# Patient Record
Sex: MALE | URBAN - METROPOLITAN AREA
[De-identification: names, ages, dates, MRNs, and addresses within clinical notes are randomized per-mention and may not be internally consistent; named-entity substitution may affect disease eponyms.]

---

## 2019-01-03 ENCOUNTER — OFFICE VISIT (OUTPATIENT)
Dept: URBAN - METROPOLITAN AREA CLINIC 23 | Facility: CLINIC | Age: 69
End: 2019-01-03
Payer: MEDICARE

## 2019-01-03 DIAGNOSIS — E10.3293 TYPE 1 DIAB W MILD NONPRLF DIABETIC RTNOP W/O MACULAR EDEMA, BILATERAL: Primary | ICD-10-CM

## 2019-01-03 PROCEDURE — 92134 CPTRZ OPH DX IMG PST SGM RTA: CPT | Performed by: OPHTHALMOLOGY

## 2019-01-03 PROCEDURE — 92004 COMPRE OPH EXAM NEW PT 1/>: CPT | Performed by: OPHTHALMOLOGY

## 2019-01-03 RX ORDER — ASPIRIN 325 MG/1
325 MG TABLET, FILM COATED ORAL
Qty: 0 | Refills: 0 | Status: INACTIVE
Start: 2019-01-03 | End: 2019-01-03

## 2019-01-03 ASSESSMENT — INTRAOCULAR PRESSURE
OD: 10
OS: 11

## 2019-01-03 NOTE — IMPRESSION/PLAN
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 OU. Condition: stable. Vision: vision not affected. Per pt: he is Type I Diabetic. Plan: Discussed diagnosis in detail with patient. Exam shows minimal Diabetic changes OU. No treatment is recommended at this time. Emphasized blood sugar control and advised to keep future appointments with PCP and/or Endocrinologist for the management of Diabetes. Recommend observation for now. OCT shows the macula is stable OU. Recommend a yearly retina exam, sooner if there is a change or decrease in vision.